# Patient Record
Sex: MALE | Race: WHITE
[De-identification: names, ages, dates, MRNs, and addresses within clinical notes are randomized per-mention and may not be internally consistent; named-entity substitution may affect disease eponyms.]

---

## 2021-06-15 ENCOUNTER — HOSPITAL ENCOUNTER (OUTPATIENT)
Dept: HOSPITAL 53 - M RAD | Age: 76
End: 2021-06-15
Attending: NURSE PRACTITIONER
Payer: MEDICARE

## 2021-06-15 DIAGNOSIS — I10: Primary | ICD-10-CM

## 2021-06-15 DIAGNOSIS — Z13.6: ICD-10-CM

## 2021-06-15 NOTE — REP
INDICATION:

AAA



COMPARISON:

None.



TECHNIQUE:

Multiple ultrasonographic images of the abdominal aorta were obtained from the level

of the celiac access to the aortoiliac bifurcation and the longitudinal and transverse

scan planes along with color Doppler imaging.



FINDINGS:

The maximal AP dimension of the abdominal aorta as measured in the longitudinal scan

plane is 3.8 cm.  This is estimated as the technologist performing examination could

not identify the posterior aortic wall at that mid abdominal level due to calcified

plaque formation.  This has a length of 9.6 cm.



There is no evidence of common iliac arterial ectasia.



IMPRESSION:

The exam is limited.  Definite aortic measurements could not be obtained as described

above.  Although I feel I can identify a good portion of the posterior aortic wall the

performing technologist could not, as such, CT a of the abdominal aorta should be

considered.  Sign report.





<Electronically signed by Soren Millan > 06/15/21 1790

## 2021-09-25 ENCOUNTER — HOSPITAL ENCOUNTER (OUTPATIENT)
Dept: HOSPITAL 53 - M LABSMTC | Age: 76
End: 2021-09-25
Attending: INTERNAL MEDICINE
Payer: MEDICARE

## 2021-09-25 DIAGNOSIS — Z20.828: Primary | ICD-10-CM

## 2021-09-25 DIAGNOSIS — Z11.59: ICD-10-CM
